# Patient Record
Sex: FEMALE | Race: WHITE | Employment: STUDENT | URBAN - NONMETROPOLITAN AREA
[De-identification: names, ages, dates, MRNs, and addresses within clinical notes are randomized per-mention and may not be internally consistent; named-entity substitution may affect disease eponyms.]

---

## 2017-07-27 ENCOUNTER — OFFICE VISIT (OUTPATIENT)
Dept: OPTOMETRY | Age: 22
End: 2017-07-27
Payer: COMMERCIAL

## 2017-07-27 DIAGNOSIS — H52.203 ASTIGMATISM, BILATERAL: Primary | ICD-10-CM

## 2017-07-27 PROCEDURE — 92004 COMPRE OPH EXAM NEW PT 1/>: CPT | Performed by: OPTOMETRIST

## 2017-07-27 RX ORDER — BENOXINATE HCL/FLUORESCEIN SOD 0.4%-0.25%
1 DROPS OPHTHALMIC (EYE) ONCE
Status: COMPLETED | OUTPATIENT
Start: 2017-07-27 | End: 2017-07-27

## 2017-07-27 RX ADMIN — Medication 1 DROP: at 16:03

## 2017-07-27 ASSESSMENT — REFRACTION_WEARINGRX
SPECS_TYPE: SVL
OS_CYLINDER: -1.00
OD_SPHERE: -0.25
OS_AXIS: 010
OS_SPHERE: +2.25

## 2017-07-27 ASSESSMENT — REFRACTION_MANIFEST
OD_CYLINDER: DS
OD_SPHERE: -0.25
OS_SPHERE: PLANO
OS_AXIS: 013
OS_CYLINDER: -0.75

## 2017-07-27 ASSESSMENT — VISUAL ACUITY
METHOD: SNELLEN - LINEAR
OS_SC+: -2
OS_SC: 20/20
OD_SC: 20/20

## 2017-07-27 ASSESSMENT — SLIT LAMP EXAM - LIDS
COMMENTS: NORMAL
COMMENTS: NORMAL

## 2021-03-31 ENCOUNTER — OFFICE VISIT (OUTPATIENT)
Dept: OPTOMETRY | Age: 26
End: 2021-03-31
Payer: COMMERCIAL

## 2021-03-31 DIAGNOSIS — H53.8 BLURRED VISION, BILATERAL: ICD-10-CM

## 2021-03-31 DIAGNOSIS — H52.03 HYPEROPIA OF BOTH EYES WITH ASTIGMATISM: Primary | ICD-10-CM

## 2021-03-31 DIAGNOSIS — H52.203 HYPEROPIA OF BOTH EYES WITH ASTIGMATISM: Primary | ICD-10-CM

## 2021-03-31 PROCEDURE — 92004 COMPRE OPH EXAM NEW PT 1/>: CPT | Performed by: OPTOMETRIST

## 2021-03-31 RX ORDER — NORGESTIMATE AND ETHINYL ESTRADIOL 7DAYSX3 LO
KIT ORAL
COMMUNITY
Start: 2021-03-13

## 2021-03-31 ASSESSMENT — KERATOMETRY
OS_K2POWER_DIOPTERS: 43.25
OD_AXISANGLE2_DEGREES: 173
OD_K2POWER_DIOPTERS: 43.25
OD_AXISANGLE_DEGREES: 083
OS_AXISANGLE2_DEGREES: 005
METHOD_AUTO_MANUAL: AUTOMATED
OD_K1POWER_DIOPTERS: 41.75
OS_AXISANGLE_DEGREES: 095
OS_K1POWER_DIOPTERS: 42.00

## 2021-03-31 ASSESSMENT — REFRACTION_MANIFEST
OS_AXIS: 013
OD_CYLINDER: DS
OS_SPHERE: PLANO
OD_SPHERE: -0.25
OS_CYLINDER: -0.75

## 2021-03-31 ASSESSMENT — TONOMETRY
OD_IOP_MMHG: 18
OS_IOP_MMHG: 19
IOP_METHOD: NON-CONTACT AIR PUFF

## 2021-03-31 ASSESSMENT — VISUAL ACUITY
OS_SC+: -1
METHOD: SNELLEN - LINEAR
OS_SC: 20/30
OD_SC: 20/25

## 2021-03-31 ASSESSMENT — REFRACTION_WEARINGRX
OS_SPHERE: PLANO
OS_AXIS: 013
OS_CYLINDER: -0.75
OD_SPHERE: -0.25
OD_CYLINDER: DS
SPECS_TYPE: SVL

## 2021-03-31 ASSESSMENT — SLIT LAMP EXAM - LIDS
COMMENTS: NORMAL
COMMENTS: NORMAL

## 2021-03-31 ASSESSMENT — ENCOUNTER SYMPTOMS
RESPIRATORY NEGATIVE: 0
ALLERGIC/IMMUNOLOGIC NEGATIVE: 0
EYES NEGATIVE: 0
GASTROINTESTINAL NEGATIVE: 0

## 2021-03-31 NOTE — PROGRESS NOTES
Clinton Mortensen presents today for   Chief Complaint   Patient presents with    Blurred Vision    Vision Exam   .    HPI     Blurred Vision     In both eyes. Vision is blurred. Context:  distance vision. Comments     Last Vision Exam: 7/27/2017 Aw  Last Ophthalmology Exam: n/a  Last Filled Glasses Rx: 7/27/2017  Insurance: [employer]  Update: Glasses  Distance is getting more blurry  Does not have her glasses any more  Feels the vision is worse driving at night                Current Outpatient Medications   Medication Sig Dispense Refill    TRI-LO-YESI 0.18/0.215/0.25 MG-25 MCG TABS Take 1 tablet by mouth daily.  albuterol sulfate  (90 BASE) MCG/ACT inhaler Inhale 2 puffs into the lungs every 4 hours as needed for Wheezing      azithromycin (ZITHROMAX) 250 MG tablet Take 2 tablets (500 mg) on Day 1, followed by 1 tablet (250 mg) once daily on Days 2 through 5. (Patient not taking: Reported on 3/31/2021) 1 packet 0     No current facility-administered medications for this visit.           Family History   Problem Relation Age of Onset    Glaucoma Father     Diabetes Neg Hx     Cataracts Neg Hx      Social History     Socioeconomic History    Marital status: Single     Spouse name: None    Number of children: None    Years of education: None    Highest education level: None   Occupational History    None   Social Needs    Financial resource strain: None    Food insecurity     Worry: None     Inability: None    Transportation needs     Medical: None     Non-medical: None   Tobacco Use    Smoking status: Never Smoker    Smokeless tobacco: Never Used   Substance and Sexual Activity    Alcohol use: No    Drug use: No    Sexual activity: None   Lifestyle    Physical activity     Days per week: None     Minutes per session: None    Stress: None   Relationships    Social connections     Talks on phone: None     Gets together: None     Attends Hinduism service: None     Active member of club or organization: None     Attends meetings of clubs or organizations: None     Relationship status: None    Intimate partner violence     Fear of current or ex partner: None     Emotionally abused: None     Physically abused: None     Forced sexual activity: None   Other Topics Concern    None   Social History Narrative    None     Past Medical History:   Diagnosis Date    Asthma     Scoliosis        ROS     Negative for: Constitutional, Gastrointestinal, Neurological, Skin, Genitourinary, Musculoskeletal, HENT, Endocrine, Cardiovascular, Eyes, Respiratory, Psychiatric, Allergic/Imm, Heme/Lymph          Main Ophthalmology Exam     External Exam       Right Left    External Normal Normal          Slit Lamp Exam       Right Left    Lids/Lashes Normal Normal    Conjunctiva/Sclera White and quiet White and quiet    Cornea Clear Clear    Anterior Chamber Deep and quiet Deep and quiet    Iris Round and reactive Round and reactive    Lens Clear Clear    Vitreous Normal Normal          Fundus Exam       Right Left    Disc Normal Normal    C/D Ratio 0.15 0.15    Macula Normal Normal    Vessels Normal Normal                   Tonometry     Tonometry (Non-contact air puff, 3:35 PM)       Right Left    Pressure 18 19               Not recorded         Not recorded          Visual Acuity (Snellen - Linear)       Right Left    Dist sc 20/25 20/30 -1          Pupils     Pupils       Pupils    Right PERRL    Left PERRL              Neuro/Psych     Neuro/Psych     Oriented x3: Yes    Mood/Affect: Normal              Keratometry     Keratometry (Automated)       K1 Axis K2 Axis    Right 41.75 173 43.25 083    Left 42.00 005 43.25 095                  Ophthalmology Exam     Wearing Rx       Sphere Cylinder Axis    Right -0.25 ds     Left [place] -0.75 013    Age: 6yrs    Type: SVL              Manifest Refraction     Manifest Refraction       Sphere Cylinder Axis Dist [place]    Right -0.25 ds  20/20    Left [place] -0.75 013 20/20          Manifest Refraction #2 (Auto)       Sphere Cylinder Axis Dist VA    Right +0.25 -0.75 003     Left +0.75 -0.75 016                Final Rx       Sphere Cylinder Axis    Right -0.25 ds     Left [place] -0.75 013    Type: SVL    Expiration Date: 4/1/2023            IMPRESSION:  1. Hyperopia of both eyes with astigmatism    2. Blurred vision, bilateral        PLAN:    1. New glasses recommended for when needed   2.  \"      Patient Instructions   New glasses recommended      Return in about 2 years (around 3/31/2023) for complete eye exam.